# Patient Record
Sex: FEMALE | Race: WHITE
[De-identification: names, ages, dates, MRNs, and addresses within clinical notes are randomized per-mention and may not be internally consistent; named-entity substitution may affect disease eponyms.]

---

## 2018-01-23 ENCOUNTER — HOSPITAL ENCOUNTER (OUTPATIENT)
Dept: HOSPITAL 95 - ORSCSDS | Age: 83
Discharge: HOME | End: 2018-01-23
Attending: INTERNAL MEDICINE
Payer: MEDICARE

## 2018-01-23 VITALS — BODY MASS INDEX: 53.24 KG/M2 | WEIGHT: 293 LBS | HEIGHT: 62.01 IN

## 2018-01-23 DIAGNOSIS — K64.8: ICD-10-CM

## 2018-01-23 DIAGNOSIS — K57.30: ICD-10-CM

## 2018-01-23 DIAGNOSIS — Z85.038: Primary | ICD-10-CM

## 2018-01-23 DIAGNOSIS — Z87.891: ICD-10-CM

## 2018-01-23 DIAGNOSIS — R15.9: ICD-10-CM

## 2018-01-23 DIAGNOSIS — Z86.010: ICD-10-CM

## 2018-01-23 DIAGNOSIS — K62.1: ICD-10-CM

## 2018-01-23 PROCEDURE — 0DBP8ZX EXCISION OF RECTUM, VIA NATURAL OR ARTIFICIAL OPENING ENDOSCOPIC, DIAGNOSTIC: ICD-10-PCS | Performed by: INTERNAL MEDICINE

## 2020-01-06 NOTE — NUR
SHIFT SUMMARY:
PATIENT ON MEDICAL FLOOR TO RECEIVE 2X UNITS PRBCs-ARPITA UNABLE TO ACCOMODATE.
MEDIPORT ACCESSED TO R CHEST. PT A&O; CALM AND COOPERATIVE WITH CARE. EXPECTED
D/C TO HOME AFTER INFUSION COMPLETE. REPORT GIVEN TO ONCOMING RN.

## 2020-01-06 NOTE — NUR
PT STATUS
TRANSFUSION COMPLETE, MEDIPORT DEACCESSED. VITAL SIGNS WNL. PT DRESSED.
 INFORMED. PT DENIES S/SX. PT ESCORTED OUT BY .